# Patient Record
Sex: MALE | URBAN - METROPOLITAN AREA
[De-identification: names, ages, dates, MRNs, and addresses within clinical notes are randomized per-mention and may not be internally consistent; named-entity substitution may affect disease eponyms.]

---

## 2018-03-27 DIAGNOSIS — M25.551 RIGHT HIP PAIN: Primary | ICD-10-CM

## 2018-03-27 DIAGNOSIS — M25.552 LEFT HIP PAIN: ICD-10-CM

## 2018-04-06 ENCOUNTER — HOSPITAL ENCOUNTER (OUTPATIENT)
Dept: RADIOLOGY | Facility: OTHER | Age: 48
Discharge: HOME | End: 2018-04-06
Attending: ORTHOPAEDIC SURGERY
Payer: COMMERCIAL

## 2018-04-06 DIAGNOSIS — M25.552 LEFT HIP PAIN: ICD-10-CM

## 2018-04-06 PROCEDURE — 25500020 PHARM REV CODE 255: Performed by: ORTHOPAEDIC SURGERY

## 2018-04-06 PROCEDURE — 27093 INJECTION FOR HIP X-RAY: CPT | Mod: TC

## 2018-04-06 PROCEDURE — 73721 MRI JNT OF LWR EXTRE W/O DYE: CPT | Mod: 26,LT,, | Performed by: RADIOLOGY

## 2018-04-06 PROCEDURE — A9585 GADOBUTROL INJECTION: HCPCS | Performed by: ORTHOPAEDIC SURGERY

## 2018-04-06 PROCEDURE — 73525 CONTRAST X-RAY OF HIP: CPT | Mod: 26,LT,, | Performed by: RADIOLOGY

## 2018-04-06 PROCEDURE — 73721 MRI JNT OF LWR EXTRE W/O DYE: CPT | Mod: TC,LT

## 2018-04-06 PROCEDURE — 27093 INJECTION FOR HIP X-RAY: CPT | Mod: LT,,, | Performed by: RADIOLOGY

## 2018-04-06 RX ORDER — GADOBUTROL 604.72 MG/ML
5 INJECTION INTRAVENOUS
Status: COMPLETED | OUTPATIENT
Start: 2018-04-06 | End: 2018-04-06

## 2018-04-06 RX ORDER — GADOBUTROL 604.72 MG/ML
5 INJECTION INTRAVENOUS
Status: DISCONTINUED | OUTPATIENT
Start: 2018-04-06 | End: 2018-04-07 | Stop reason: HOSPADM

## 2018-04-06 RX ADMIN — IOHEXOL 6 ML: 350 INJECTION, SOLUTION INTRAVENOUS at 01:04

## 2018-04-06 RX ADMIN — GADOBUTROL 5 ML: 604.72 INJECTION INTRAVENOUS at 01:04

## 2018-04-06 NOTE — OP NOTE
[facility]  Interventional Radiology  Procedure - Outpatient    Date: 04/06/2018 Time: 3:16 PM    Pre-Op Diagnosis: Left hip pain    Post-Op Diagnosis: same    Procedure Performed by: Wilberto Fox MD    Assistant: none    Procedure: Fluoro guided left hip arthrogram/injection prior to MR arthrography    Specimen/Tissue Removed: No    Estimated Blood Loss: Less than 5 mL    Procedure Note/Findings: Fluoro guided left hip injection performed prior to MR arthrogram. No immediate post-procedure complications noted.    Please refer to dictated report for additional details.

## 2018-04-06 NOTE — H&P
[facility]  History & Physical - Short Stay  Interventional Radiology    SUBJECTIVE:     Chief Complaint/Reason for Admission: Left hip pain    Informant(s):  self and Electronic Health Record    History of Present Illness:  Evelio Clark is a 47 y.o. male with a history of left hip pain.    Patient presents for left hip arthrogram.    Scheduled Meds:   Continuous Infusions:   PRN Meds:     Review of patient's allergies indicates:  Allergies not on file    No past medical history on file.  No past surgical history on file.  No family history on file.  Social History   Substance Use Topics    Smoking status: Not on file    Smokeless tobacco: Not on file    Alcohol use Not on file        Review of Systems:  ROS not obtained    OBJECTIVE:     Vital Signs (Most Recent):       Physical Exam:  alert and oriented    Laboratory  CBC: No results found for: WBC, RBC, HGB, HCT, PLT, MCV, MCH, MCHC  Coagulation: No results found for: PT, INR, APTT      ASSESSMENT/PLAN:     Left hip pain    Patient will undergo left hip arthrogram    Sedation/Anesthesia Assessment:    Sedation Plan: 1% lidocaine local anesthesia

## 2021-06-22 NOTE — DISCHARGE SUMMARY
Radiology Discharge Summary      Admit date: 4/6/2018 12:45 PM  Discharge date: April 6, 2018    Instructions Given to patient: YesVerbal    Diet: Regular    Activity:NO Restrictions    Medications on discharge (List): Refer to Discharge Medication List    Hospital Course: Fluoro guided left hip arthrogram    Description of Condition on Discharge: stable    Discharge Disposition: Home    Discharge Diagnosis: Left hip pain.    Follow up with Dr. Blum as scheduled.   No